# Patient Record
Sex: FEMALE | Race: BLACK OR AFRICAN AMERICAN | NOT HISPANIC OR LATINO
[De-identification: names, ages, dates, MRNs, and addresses within clinical notes are randomized per-mention and may not be internally consistent; named-entity substitution may affect disease eponyms.]

---

## 2024-08-27 ENCOUNTER — DASHBOARD ENCOUNTERS (OUTPATIENT)
Age: 24
End: 2024-08-27

## 2024-08-27 ENCOUNTER — OFFICE VISIT (OUTPATIENT)
Dept: URBAN - METROPOLITAN AREA CLINIC 74 | Facility: CLINIC | Age: 24
End: 2024-08-27
Payer: COMMERCIAL

## 2024-08-27 ENCOUNTER — LAB OUTSIDE AN ENCOUNTER (OUTPATIENT)
Dept: URBAN - METROPOLITAN AREA CLINIC 74 | Facility: CLINIC | Age: 24
End: 2024-08-27

## 2024-08-27 VITALS
SYSTOLIC BLOOD PRESSURE: 120 MMHG | WEIGHT: 213.4 LBS | BODY MASS INDEX: 33.49 KG/M2 | OXYGEN SATURATION: 98 % | HEIGHT: 67 IN | TEMPERATURE: 98.9 F | HEART RATE: 107 BPM | DIASTOLIC BLOOD PRESSURE: 64 MMHG

## 2024-08-27 DIAGNOSIS — K59.03 DRUG-INDUCED CONSTIPATION: ICD-10-CM

## 2024-08-27 DIAGNOSIS — K62.5 RECTAL BLEEDING: ICD-10-CM

## 2024-08-27 PROCEDURE — 99203 OFFICE O/P NEW LOW 30 MIN: CPT

## 2024-08-27 RX ORDER — NORETHINDRONE ACETATE/ETHINYL ESTRADIOL AND FERROUS FUMARATE 1MG-20(21)
KIT ORAL
Qty: 28 TABLET | Status: ACTIVE | COMMUNITY

## 2024-08-27 RX ORDER — DULAGLUTIDE 3 MG/.5ML
AS DIRECTED INJECTION, SOLUTION SUBCUTANEOUS
Status: ACTIVE | COMMUNITY

## 2024-08-27 RX ORDER — POLYETHYLENE GLYCOL 3350 17 G/17G
1 SCOOP MIXED WITH 8 OUNCES OF FLUID POWDER, FOR SOLUTION ORAL ONCE A DAY
Status: ACTIVE | COMMUNITY

## 2024-08-27 RX ORDER — KETOCONAZOLE 20 MG/ML
APPLY 1 APPLICATION OF SHAMPOO TO SCALP LATHER, LEAVE 15 MINS, THEN RINSE DAILY FOR 2 WEEKS, THEN 3 TIMES A WEEK AS NEEDED SHAMPOO, SUSPENSION TOPICAL
Qty: 120 MILLILITER | Refills: 1 | Status: ACTIVE | COMMUNITY

## 2024-08-27 RX ORDER — HYDROCORTISONE 25 MG/G
1 APPLICATION CREAM TOPICAL TWICE A DAY
Status: ACTIVE | COMMUNITY

## 2024-08-27 RX ORDER — METHOCARBAMOL 750 MG/1
1 TABLET TABLET ORAL
Status: ON HOLD | COMMUNITY

## 2024-08-27 RX ORDER — NORETHINDRONE ACETATE AND ETHINYL ESTRADIOL AND FERROUS FUMARATE 1MG-20(21)
1 TABLET KIT ORAL ONCE A DAY
Status: DISCONTINUED | COMMUNITY

## 2024-08-27 RX ORDER — NAPROXEN 500 MG/1
1 TABLET WITH FOOD OR MILK AS NEEDED TABLET ORAL
Status: ON HOLD | COMMUNITY

## 2024-08-27 NOTE — HPI-TODAY'S VISIT:
Patient is a 23 yo female who presents for constipation and bleeding. Patient reports constipation that started beginning of July, which is when patient started insulin and Trulicity. Patient has new diagnosis of T2DM at this time. Constipation started after diagnosis of DM and initiation of medication. Patient had BM once a week with firm stool and straining. She then started seeing bright red blood on toilet paper and in toilet. This lasted for 2 weeks. Patient started Miralax daily which has helped constipation. She also started Anusol cream which took a week before bleeding stopped. Patient denies any abdominal pain, family hx of colon cancer, family hx of IBD. Patient was taking Robaxin and naproxen daily due to back pain but has recently stopped. Patient does note standing long hours at work.   8/14/24: CBC, CMP, PT-INR, lipase normal. A1c 11  KUB: no acute intra-abdominal process

## 2024-09-23 ENCOUNTER — OFFICE VISIT (OUTPATIENT)
Dept: URBAN - METROPOLITAN AREA SURGERY CENTER 30 | Facility: SURGERY CENTER | Age: 24
End: 2024-09-23

## 2024-10-09 ENCOUNTER — OFFICE VISIT (OUTPATIENT)
Dept: URBAN - METROPOLITAN AREA CLINIC 74 | Facility: CLINIC | Age: 24
End: 2024-10-09